# Patient Record
Sex: FEMALE | Race: WHITE
[De-identification: names, ages, dates, MRNs, and addresses within clinical notes are randomized per-mention and may not be internally consistent; named-entity substitution may affect disease eponyms.]

---

## 2018-01-22 ENCOUNTER — HOSPITAL ENCOUNTER (EMERGENCY)
Dept: HOSPITAL 62 - ER | Age: 63
Discharge: ANOTHER HOSPITAL | End: 2018-01-22
Payer: COMMERCIAL

## 2018-01-22 VITALS — DIASTOLIC BLOOD PRESSURE: 70 MMHG | SYSTOLIC BLOOD PRESSURE: 121 MMHG

## 2018-01-22 DIAGNOSIS — I95.9: ICD-10-CM

## 2018-01-22 DIAGNOSIS — A41.9: Primary | ICD-10-CM

## 2018-01-22 DIAGNOSIS — Z91.040: ICD-10-CM

## 2018-01-22 DIAGNOSIS — Z88.5: ICD-10-CM

## 2018-01-22 DIAGNOSIS — K44.9: ICD-10-CM

## 2018-01-22 DIAGNOSIS — J96.01: ICD-10-CM

## 2018-01-22 DIAGNOSIS — Z88.8: ICD-10-CM

## 2018-01-22 DIAGNOSIS — J69.0: ICD-10-CM

## 2018-01-22 DIAGNOSIS — Z88.1: ICD-10-CM

## 2018-01-22 DIAGNOSIS — E87.1: ICD-10-CM

## 2018-01-22 DIAGNOSIS — R41.0: ICD-10-CM

## 2018-01-22 DIAGNOSIS — J96.02: ICD-10-CM

## 2018-01-22 DIAGNOSIS — Z91.013: ICD-10-CM

## 2018-01-22 DIAGNOSIS — R65.21: ICD-10-CM

## 2018-01-22 LAB
%HYPO/RBC NFR BLD AUTO: SLIGHT %
ABSOLUTE LYMPHOCYTES# (MANUAL): 0.6 10^3/UL (ref 0.5–4.7)
ABSOLUTE MONOCYTES # (MANUAL): 0.6 10^3/UL (ref 0.1–1.4)
ABSOLUTE NEUTROPHILS# (MANUAL): 19.4 10^3/UL (ref 1.7–8.2)
ADD MANUAL DIFF: YES
ALBUMIN SERPL-MCNC: 4.3 G/DL (ref 3.5–5)
ALP SERPL-CCNC: 176 U/L (ref 38–126)
ALT SERPL-CCNC: 37 U/L (ref 9–52)
ANION GAP SERPL CALC-SCNC: 8 MMOL/L (ref 5–19)
APPEARANCE UR: (no result)
APTT PPP: YELLOW S
ARTERIAL BLOOD FIO2: (no result)
ARTERIAL BLOOD H2CO3: 1.23 MMOL/L (ref 1.05–1.35)
ARTERIAL BLOOD HCO3: 25.2 MMOL/L (ref 20–26)
ARTERIAL BLOOD PCO2: 40.9 MMHG (ref 35–45)
ARTERIAL BLOOD PH: 7.41 (ref 7.35–7.45)
ARTERIAL BLOOD PO2: 126.8 MMHG (ref 80–100)
ARTERIAL BLOOD TOTAL CO2: 26.4 MMOL/L (ref 21–25)
AST SERPL-CCNC: 35 U/L (ref 14–36)
BASE EXCESS BLDA CALC-SCNC: 0.5 MMOL/L
BASE EXCESS BLDV CALC-SCNC: 5.9 MMOL/L
BASOPHILS NFR BLD MANUAL: 0 % (ref 0–2)
BILIRUB DIRECT SERPL-MCNC: 0.3 MG/DL (ref 0–0.4)
BILIRUB SERPL-MCNC: 0.5 MG/DL (ref 0.2–1.3)
BILIRUB UR QL STRIP: NEGATIVE
BUN SERPL-MCNC: 10 MG/DL (ref 7–20)
CALCIUM: 10 MG/DL (ref 8.4–10.2)
CHLORIDE SERPL-SCNC: 76 MMOL/L (ref 98–107)
CO2 SERPL-SCNC: 34 MMOL/L (ref 22–30)
EOSINOPHIL NFR BLD MANUAL: 0 % (ref 0–6)
ERYTHROCYTE [DISTWIDTH] IN BLOOD BY AUTOMATED COUNT: 14 % (ref 11.5–14)
GLUCOSE SERPL-MCNC: 90 MG/DL (ref 75–110)
GLUCOSE UR STRIP-MCNC: NEGATIVE MG/DL
HCO3 BLDV-SCNC: 35.3 MMOL/L (ref 20–32)
HCT VFR BLD CALC: 38.7 % (ref 36–47)
HGB BLD-MCNC: 13.3 G/DL (ref 12–15.5)
INR PPP: 0.81
KETONES UR STRIP-MCNC: 20 MG/DL
MCH RBC QN AUTO: 31.4 PG (ref 27–33.4)
MCHC RBC AUTO-ENTMCNC: 34.3 G/DL (ref 32–36)
MCV RBC AUTO: 91 FL (ref 80–97)
MONOCYTES % (MANUAL): 3 % (ref 3–13)
NEUTS BAND NFR BLD MANUAL: 12 % (ref 3–5)
NITRITE UR QL STRIP: NEGATIVE
PCO2 BLDV: 76.3 MMHG (ref 35–63)
PH BLDV: 7.28 [PH] (ref 7.3–7.42)
PH UR STRIP: 5 [PH] (ref 5–9)
PLATELET # BLD: 111 10^3/UL (ref 150–450)
PLATELET COMMENT: (no result)
POTASSIUM SERPL-SCNC: 4.5 MMOL/L (ref 3.6–5)
PROT SERPL-MCNC: 7.6 G/DL (ref 6.3–8.2)
PROT UR STRIP-MCNC: 100 MG/DL
PROTHROMBIN TIME: 11.8 SEC (ref 11.4–15.4)
RBC # BLD AUTO: 4.23 10^6/UL (ref 3.72–5.28)
SAO2 % BLDA: 98.5 % (ref 94–98)
SEGMENTED NEUTROPHILS % (MAN): 82 % (ref 42–78)
SODIUM SERPL-SCNC: 117.9 MMOL/L (ref 137–145)
SP GR UR STRIP: 1.02
TOTAL CELLS COUNTED BLD: 100
TOXIC GRANULES BLD QL SMEAR: SLIGHT
UROBILINOGEN UR-MCNC: NEGATIVE MG/DL (ref ?–2)
VARIANT LYMPHS NFR BLD MANUAL: 3 % (ref 13–45)
WBC # BLD AUTO: 20.6 10^3/UL (ref 4–10.5)
WBC TOXIC VACUOLES BLD QL SMEAR: PRESENT

## 2018-01-22 PROCEDURE — 36415 COLL VENOUS BLD VENIPUNCTURE: CPT

## 2018-01-22 PROCEDURE — 71045 X-RAY EXAM CHEST 1 VIEW: CPT

## 2018-01-22 PROCEDURE — 93010 ELECTROCARDIOGRAM REPORT: CPT

## 2018-01-22 PROCEDURE — C1751 CATH, INF, PER/CENT/MIDLINE: HCPCS

## 2018-01-22 PROCEDURE — 81001 URINALYSIS AUTO W/SCOPE: CPT

## 2018-01-22 PROCEDURE — 99291 CRITICAL CARE FIRST HOUR: CPT

## 2018-01-22 PROCEDURE — 82803 BLOOD GASES ANY COMBINATION: CPT

## 2018-01-22 PROCEDURE — 31500 INSERT EMERGENCY AIRWAY: CPT

## 2018-01-22 PROCEDURE — 85610 PROTHROMBIN TIME: CPT

## 2018-01-22 PROCEDURE — 36556 INSERT NON-TUNNEL CV CATH: CPT

## 2018-01-22 PROCEDURE — 80053 COMPREHEN METABOLIC PANEL: CPT

## 2018-01-22 PROCEDURE — 87040 BLOOD CULTURE FOR BACTERIA: CPT

## 2018-01-22 PROCEDURE — 96365 THER/PROPH/DIAG IV INF INIT: CPT

## 2018-01-22 PROCEDURE — 87088 URINE BACTERIA CULTURE: CPT

## 2018-01-22 PROCEDURE — 85025 COMPLETE CBC W/AUTO DIFF WBC: CPT

## 2018-01-22 PROCEDURE — 99292 CRITICAL CARE ADDL 30 MIN: CPT

## 2018-01-22 PROCEDURE — 96367 TX/PROPH/DG ADDL SEQ IV INF: CPT

## 2018-01-22 PROCEDURE — 93005 ELECTROCARDIOGRAM TRACING: CPT

## 2018-01-22 PROCEDURE — 82962 GLUCOSE BLOOD TEST: CPT

## 2018-01-22 PROCEDURE — 87186 SC STD MICRODIL/AGAR DIL: CPT

## 2018-01-22 PROCEDURE — 96361 HYDRATE IV INFUSION ADD-ON: CPT

## 2018-01-22 PROCEDURE — 83605 ASSAY OF LACTIC ACID: CPT

## 2018-01-22 PROCEDURE — 87086 URINE CULTURE/COLONY COUNT: CPT

## 2018-01-22 RX ADMIN — SODIUM CHLORIDE PRN ML: 9 INJECTION, SOLUTION INTRAVENOUS at 15:55

## 2018-01-22 RX ADMIN — SODIUM CHLORIDE PRN ML: 9 INJECTION, SOLUTION INTRAVENOUS at 17:58

## 2018-01-22 NOTE — ER DOCUMENT REPORT
ED General





- General


Stated Complaint: DIFFICULTY BREATHING


Time Seen by Provider: 01/22/18 13:58


Mode of Arrival: Medic


Information source: Emergency Med Personnel


Cannot obtain history due to: Altered mental status


Notes: 





62-year-old female presents with history of MS with concerns of sob. Pt has 

inoperable hiatal hernia, had vomiting episode and found satting 84% on RA in 

significant resp distress.  Patient brought in by EMS emergency traffic due to 

respiratory failure being bagged


TRAVEL OUTSIDE OF THE [place] IN LAST 30 DAYS: No





- HPI


Onset: Just prior to arrival


Onset/Duration: Sudden


Quality of pain: No pain


Severity: Severe


Pain Level: Denies


Associated symptoms: Shortness of breath, Other - Confusion


Exacerbated by: Denies


Relieved by: Denies


Similar symptoms previously: No


Recently seen / treated by doctor: No





- Related Data


Allergies/Adverse Reactions: 


 





cefprozil Allergy (Unknown, Verified 01/22/18 15:26)


 


citric acid Allergy (Unknown, Verified 01/22/18 15:26)


 


codeine Allergy (Unknown, Verified 01/22/18 15:26)


 


ezetimibe [From Zetia] Allergy (Unknown, Verified 01/22/18 15:26)


 


fenofibrate [From Triglide] Allergy (Unknown, Verified 01/22/18 15:26)


 


Latex, Natural Rubber Allergy (Unknown, Verified 01/22/18 15:26)


 


lisinopril Allergy (Unknown, Verified 01/22/18 15:26)


 


shellfish derived Allergy (Unknown, Verified 01/22/18 15:26)


 


metatalone Allergy (Unknown, Uncoded 01/22/18 15:26)


 


metheltrexate Allergy (Unknown, Uncoded 01/22/18 15:26)


 











Past Medical History





- Social History


Smoking Status: Never Smoker


Cigarette use (# per day): No


Chew tobacco use (# tins/day): No


Smoking Education Provided: No


Family History: Reviewed & Not Pertinent





Review of Systems





- Review of Systems


Notes: 





REVIEW OF SYSTEMS:


CONSTITUTIONAL :  Denies fever,  chills, or sweats.  Denies recent illness.


EENT:   Denies eye, ear, throat, or mouth pain or symptoms.  Denies nasal or 

sinus congestion or discharge.  Denies throat, tongue, or mouth swelling or 

difficulty swallowing.


CARDIOVASCULAR:  Denies chest pain.  Denies palpitations or racing or irregular 

heart beat.  Denies ankle edema.


RESPIRATORY: Difficulty breathing and respiratory distress


GASTROINTESTINAL:  Denies abdominal pain or distention.  Denies nausea, vomiting

, or diarrhea.  Denies blood in vomitus, stools, or per rectum.  Denies black, 

tarry stools.  Denies constipation. 


GENITOURINARY:  Denies difficulty urinating, painful urination, burning, 

frequency, blood in urine, or discharge.


FEMALE  GENITOURINARY:  Denies vaginal bleeding, heavy or abnormal periods, 

irregular periods.  Denies vaginal discharge or odor. 


MUSCULOSKELETAL:  Denies back or neck pain or stiffness.  Denies joint pain or 

swelling.


SKIN:   Denies rash, lesions or sores.


HEMATOLOGIC :   Denies easy bruising or bleeding.


LYMPHATIC:  Denies swollen, enlarged glands.


NEUROLOGICAL: Confusion


PSYCHIATRIC:  Denies anxiety or stress.  Denies depression, suicidal ideation, 

or homicidal ideation.





ALL OTHER SYSTEMS REVIEWED AND NEGATIVE.











PHYSICAL EXAMINATION:





GENERAL: Likely ill-appearing female





HEAD: Atraumatic, normocephalic.





EYES: Pupils equal round and reactive to light, extraocular movements intact, 

conjunctiva are normal.





ENT: Nares patent, oropharynx clear without exudates.  Moist mucous membranes.





NECK: Normal range of motion, supple without lymphadenopathy





LUNGS: Decreased breath sounds all throughout significant respiratory distress


HEART: Regular rate and rhythm without murmurs





ABDOMEN: Soft, nontender, nondistended abdomen.  No guarding, no rebound.  No 

masses appreciated.





Female : deferred





Musculoskeletal: Was moving right upper extremity





NEUROLOGICAL: Patient quite confused





PSYCH: Anxious





SKIN: Warm, Dry, normal turgor, no rashes or lesions noted.

















Dictation was performed using Dragon voice recognition software





Physical Exam





- Vital signs


Vitals: 


 











Resp Pulse Ox


 


 17   92 


 


 01/22/18 13:51  01/22/18 13:51














Course





- Re-evaluation


Re-evalutation: 





01/22/18 14:08


On arrival patient is noted to be bagged, I took the bag off to ask her 

questions and she desatted immediately to 90% within 30 seconds, before her O2 

sats decreased further I continue to bagging, decision was made since she is a 

full code per EMS that the patient should be intubated given the level 

significant respiratory distress, she was intubated with no difficulty x-ray 

shows a large hiatal hernia


01/22/18 14:36


Naval page


01/22/18 14:57


Dr Bettencourt will accept the patient for trasnfer to the ICU 


01/22/18 17:34


Patient continues to be hypotensive, 3 L fluid bolus was given her blood 

pressures were between 60s over 40s-90s over 50s, therefore central line was 

placed in the right IJ and levo fed will be started


01/22/18 18:39


Naval transport is here to take the patient patient's pressure is much better 

at this time





- Vital Signs


Vital signs: 


 











Temp Pulse Resp BP Pulse Ox


 


 96.2 F L     16   119/73   98 


 


 01/22/18 15:30     01/22/18 18:01  01/22/18 18:00  01/22/18 18:01














- Laboratory


Result Diagrams: 


 01/22/18 13:53





 01/22/18 13:53


Laboratory results interpreted by me: 


 











  01/22/18 01/22/18 01/22/18





  13:53 13:53 13:53


 


WBC  20.6 H  


 


Plt Count  111 L  


 


Seg Neuts % (Manual)  82 H  


 


Band Neutrophils %  12 H  


 


Lymphocytes % (Manual)  3 L  


 


Abs Neuts (Manual)  19.4 H  


 


VBG pH    7.28 L


 


VBG pCO2    76.3 H*


 


VBG HCO3    35.3 H


 


Sodium   117.9 L* 


 


Chloride   76 L 


 


Carbon Dioxide   34 H 


 


Creatinine   0.26 L 


 


Alkaline Phosphatase   176 H 


 


Urine Protein   


 


Urine Ketones   


 


Urine Blood   


 


Ur Leukocyte Esterase   














  01/22/18





  17:51


 


WBC 


 


Plt Count 


 


Seg Neuts % (Manual) 


 


Band Neutrophils % 


 


Lymphocytes % (Manual) 


 


Abs Neuts (Manual) 


 


VBG pH 


 


VBG pCO2 


 


VBG HCO3 


 


Sodium 


 


Chloride 


 


Carbon Dioxide 


 


Creatinine 


 


Alkaline Phosphatase 


 


Urine Protein  100 H


 


Urine Ketones  20 H


 


Urine Blood  MODERATE H


 


Ur Leukocyte Esterase  LARGE H














- Diagnostic Test


Radiology reviewed: Image reviewed, Reports reviewed





Procedures





- Central Line


  ** Right Internal jugular


Time completed: 18:00


Consent obtained: Yes


Central line pre-insertion: Sterile PPE donned, Chloraprep applied, Sterile 

drapes applied


Central line size (Fr.): 20


Central line lumen type: Triple


Anesthetic type: 1% Lidocaine


mL's of anesthesia: 3


Ultrasound guided: Yes


CM at insertion site: 3


Line secured with sutures: Yes


Central line post-insertion: Blood return from lumens, Biopatch applied, Sutured

, Sterile dressing applied, Position confirmed w/ CXR


Number of attempts: 1


Complications: No





- Intubation


  ** Orotracheal


Time of Intubation: 14:00


Airway evaluation: Copious secretions, Neck immobility


Mallampati Classification: Class 4


Medications: Etomidate, Succinylcholine


Intubation method: Orotracheal


Blade type: Mehdi


Blade size: 4


ETT size: 7.0


ETT secured at: Teeth


ETT secured at (cm): 22


Breath Sounds after Intubation: Equal


End tidal CO2 confirmed: Yes


Post Intubation Xray: Yes


Intubation Complications: No complications





Critical Care Note





- Critical Care Note


Total time excluding time spent on procedures (mins): 94


Comments: 





85 minutes of critical care time spent in direct contact evaluating and 

reevaluating the patient, treating symptoms, reviewing labs and studies and 

speaking with family  and consultants excluding any procedures





Discharge





- Discharge


Clinical Impression: 


 Septic shock, Severe hyponatremia





Hypotension


Qualifiers:


 Hypotension type: unspecified hypotension type Qualified Code(s): I95.9 - 

Hypotension, unspecified





Aspiration pneumonia


Qualifiers:


 Aspiration pneumonia type: unspecified Laterality: right Lung location: lower 

lobe of lung Qualified Code(s): J69.0 - Pneumonitis due to inhalation of food 

and vomit





Respiratory failure


Qualifiers:


 Chronicity: acute Respiratory failure complication: hypoxia and hypercapnia 

Qualified Code(s): J96.01 - Acute respiratory failure with hypoxia





Condition: Critical


Disposition: [facility]

## 2018-01-22 NOTE — EKG REPORT
SEVERITY:- ABNORMAL ECG -

SINUS TACHYCARDIA

LEFT ATRIAL ABNORMALITY

CONSIDER LEFT VENTRICULAR HYPERTROPHY

PROBABLE INFERIOR INFARCT, OLD

:

Confirmed by: Demetrius Rust 22-Jan-2018 23:33:35

## 2018-01-22 NOTE — RADIOLOGY REPORT (SQ)
EXAM DESCRIPTION:  CHEST SINGLE VIEW



COMPLETED DATE/TIME:  1/22/2018 2:35 pm



REASON FOR STUDY:  post intubation for aspiration



COMPARISON:  Abdomen film 2/4/2016



EXAM PARAMETERS:  NUMBER OF VIEWS: One view.

TECHNIQUE: Single frontal radiographic view of the chest acquired.

RADIATION DOSE: NA

LIMITATIONS: None.



FINDINGS:  LUNGS AND PLEURA: New abnormal air space density in the left mid lung consistent with aspi
ration or pneumonia.  Remainder lungs appear clear.  No definite effusion.

MEDIASTINUM AND HILAR STRUCTURES: Large diaphragmatic hernia extending into the lower right chest.

HEART AND VASCULAR STRUCTURES: Cardiac silhouette is of normal size.  Mild vascular prominence felt t
o be related to the supine nature of the exam

BONES: No acute findings.

HARDWARE: Endotracheal tube with its tip 2.5 cm above the sharri.  Additional catheter is noted with 
tip overlying the lower neck which may represent NG tube.

OTHER: No other significant finding.



IMPRESSION:  1.  Large diaphragmatic hernia, a chronic finding.

2.  Endotracheal tube is in good position.

3.  New abnormal density in the left mid lung consistent with aspiration or pneumonia.



TECHNICAL DOCUMENTATION:  JOB ID:  [id number]

 2011 [employer]- All Rights Reserved

## 2018-01-22 NOTE — RADIOLOGY REPORT (SQ)
EXAM DESCRIPTION:  CHEST SINGLE VIEW



COMPLETED DATE/TIME:  1/22/2018 5:55 pm



REASON FOR STUDY:  post right IJ central line



COMPARISON:  1/22/2018 at 1402 hours.



EXAM PARAMETERS:  NUMBER OF VIEWS: One view.

TECHNIQUE: Single frontal radiographic view of the chest acquired.

RADIATION DOSE: NA

LIMITATIONS: None.



FINDINGS:  LUNGS AND PLEURA: Assessment lower right lung limited due to the large hiatal hernia.  No 
pneumothorax.  Again seen is airspace disease in the left lung.

MEDIASTINUM AND HILAR STRUCTURES: No masses.  Contour normal.

HEART AND VASCULAR STRUCTURES: Heart normal in size.  Normal vasculature.

BONES: No acute findings.

HARDWARE: Central line on the right side with the tip at the level of the cavoatrial junction.  Stabl
e endotracheal tube.  Additional tubing in the neck is probably nasogastric tube which has curled in 
the throat.  Tip at the level of the clavicles.

OTHER: No other significant finding.



IMPRESSION:  LIFE LINES AS DESCRIBED.  NASOGASTRIC TUBE NEEDS TO BE REPOSITIONED.  NO PNEUMOTHORAX FO
LLOWING CENTRAL LINE PLACEMENT.



TECHNICAL DOCUMENTATION:  JOB ID:  [id number]

 2011 [employer]- All Rights Reserved